# Patient Record
Sex: FEMALE | Race: WHITE | NOT HISPANIC OR LATINO | Employment: PART TIME | URBAN - METROPOLITAN AREA
[De-identification: names, ages, dates, MRNs, and addresses within clinical notes are randomized per-mention and may not be internally consistent; named-entity substitution may affect disease eponyms.]

---

## 2017-08-29 ENCOUNTER — OFFICE VISIT (OUTPATIENT)
Dept: URGENT CARE | Facility: CLINIC | Age: 52
End: 2017-08-29

## 2017-08-29 PROCEDURE — 99203 OFFICE O/P NEW LOW 30 MIN: CPT

## 2017-09-06 DIAGNOSIS — Z12.31 ENCOUNTER FOR SCREENING MAMMOGRAM FOR MALIGNANT NEOPLASM OF BREAST: ICD-10-CM

## 2017-09-06 DIAGNOSIS — Z12.11 ENCOUNTER FOR SCREENING FOR MALIGNANT NEOPLASM OF COLON: ICD-10-CM

## 2017-09-14 ENCOUNTER — ALLSCRIPTS OFFICE VISIT (OUTPATIENT)
Dept: OTHER | Facility: OTHER | Age: 52
End: 2017-09-14

## 2018-01-13 VITALS
BODY MASS INDEX: 24.36 KG/M2 | OXYGEN SATURATION: 97 % | HEART RATE: 72 BPM | SYSTOLIC BLOOD PRESSURE: 162 MMHG | HEIGHT: 62 IN | DIASTOLIC BLOOD PRESSURE: 88 MMHG | TEMPERATURE: 97.4 F | WEIGHT: 132.38 LBS

## 2018-01-23 NOTE — PROGRESS NOTES
Assessment   1  Encounter for preventive health examination (V70 0) (Z00 00)    Plan  Encounter for PPD test    · Administered: PPD  Health Maintenance    · Follow-up visit in 1 year Evaluation and Treatment  Follow-up  Status: Hold For -  Scheduling  Requested for: [id number]    Discussion/Summary  Discussion Summary:   Ppd today for school form  Pt will look into free mammo /pap screening program locally and once has coverage will get colon screen  She is hoping to find a FT job  Rto 1yr/prn1        Medication SE Review and Pt Understands Tx: The treatment plan was reviewed with the patient/guardian  The patient/guardian understands and agrees with the treatment plan       Self Referrals:   Self Referrals: No       1 Amended By: Debbie Aburto; Aug 29 2016 9:05 PM EST    Chief Complaint  Chief Complaint Free Text Note Form: Pt presents for Initial exam  Pt feels well and without complaints at this time  Pt has physical form to be filled out today  Pt doesn't take any medication daily  PPD done today  Pt appetite is good and he does drink water daily  Advance Directives  Advance Directive [facility]:   NO - Patient does not have an advance health care directive  History of Present Illness  HPI: Pt for PE for sub teaching  Has been well, no medical coverage at present  No recent illnesses  Overdue for mammo,gyn colon screenings1        1 Amended By: Debbie Aburto; Aug 29 2016 9:02 PM EST    Review of Systems  Complete-Female:   Constitutional:1  No fever, no chills, feels well, no tiredness, no recent weight gain or weight loss1   Eyes:1  No complaints of eye pain, no red eyes, no eyesight problems, no discharge, no dry eyes, no itching of eyes1   ENT:1  no complaints of earache, no loss of hearing, no nose bleeds, no nasal discharge, no sore throat, no hoarseness1      Cardiovascular:1  No complaints of slow heart rate, no fast heart rate, no chest pain, no palpitations, no leg claudication, no lower extremity edema1   Respiratory:1  No complaints of shortness of breath, no wheezing, no cough, no SOB on exertion, no orthopnea, no PND1   Gastrointestinal:1  No complaints of abdominal pain, no constipation, no nausea or vomiting, no diarrhea, no bloody stools1   Genitourinary:1  No complaints of dysuria, no incontinence, no pelvic pain, no dysmenorrhea, no vaginal discharge or bleeding1   Musculoskeletal:1  arthralgias1  and chronic neck pain1   Integumentary:1  No complaints of skin rash or lesions, no itching, no skin wounds, no breast pain or lump1   Neurological:1  No complaints of headache, no confusion, no convulsions, no numbness, no dizziness or fainting, no tingling, no limb weakness, no difficulty walking1 , no dizziness1  and no limb weakness1   Psychiatric:1  Not suicidal, no sleep disturbance, no anxiety or depression, no change in personality, no emotional problems1  and no sleep disturbances1   Endocrine:1  No complaints of proptosis, no hot flashes, no muscle weakness, no deepening of the voice, no feelings of weakness1   Hematologic/Lymphatic:1  No complaints of swollen glands, no swollen glands in the neck, does not bleed easily, does not bruise easily1         1 Amended By: Arlette Acevedo; Aug 29 2016 9:03 PM EST    Active Problems   1  Encounter for screening mammogram for malignant neoplasm of breast (V76 12)   (Z12 31)    Past Medical History  Active Problems And Past Medical History Reviewed: The active problems and past medical history were reviewed and updated today  Surgical History  Surgical History Reviewed: The surgical history was reviewed and updated today  Family History  Mother   1  FHx: malignant neoplasm of breast (V16 3) (Z80 3)  Father   2  Family history of Stroke, thrombotic  Family History Reviewed: The family history was reviewed and updated today         Social History    · Always uses seat belt   · Always uses sunscreen   · Caffeine use (V49 89) (F15 90)   · Dental care, regularly   · Never a smoker   · Occasional alcohol use   · Single  Social History Reviewed: The social history was reviewed and updated today  The social history was reviewed and is unchanged  Current Meds  1  No Reported Medications Recorded    Allergies   1  Penicillins    Vitals  Signs   Recorded: [id number] 18:08FM   Systolic: 748  Diastolic: 72  Recorded: 50COP2875 01:32PM   Temperature: 97 4 F  Height: 5 ft 2 in  Weight: 131 lb 4 00 oz  BMI Calculated: 24 01  BSA Calculated: 1 6    Physical Exam    Constitutional1    General appearance: No acute distress, well appearing and well nourished  1    Eyes1    Conjunctiva and lids: No swelling, erythema or discharge  1    Pupils and irises: Equal, round and reactive to light  1    Ears, Nose, Mouth, and Throat1    External inspection of ears and nose: Normal 1    Otoscopic examination: Tympanic membranes translucent with normal light reflex  Canals patent without erythema  1    Nasal mucosa, septum, and turbinates: Normal without edema or erythema  1    Oropharynx: Normal with no erythema, edema, exudate or lesions  1    Pulmonary1    Respiratory effort: No increased work of breathing or signs of respiratory distress  1    Auscultation of lungs: Clear to auscultation  1    Cardiovascular1    Palpation of heart: Normal PMI, no thrills  1    Auscultation of heart: Normal rate and rhythm, normal S1 and S2, without murmurs  1    Examination of extremities for edema and/or varicosities: Normal 1    Carotid pulses: Normal 1    Abdomen1    Abdomen: Non-tender, no masses  1    Liver and spleen: No hepatomegaly or splenomegaly  1    Lymphatic1    Palpation of lymph nodes in neck: No lymphadenopathy  1    Musculoskeletal1    Gait and station: Normal 1    Digits and nails: Normal without clubbing or cyanosis  1    Inspection/palpation of joints, bones, and muscles: Normal 1    Skin1    Skin and subcutaneous tissue: Normal without rashes or lesions  1    Neurologic1    Cranial nerves: Cranial nerves 2-12 intact  1    Reflexes: 2+ and symmetric  1    Sensation: No sensory loss  1    Psychiatric1    Orientation to person, place, and time: Normal 1    Mood and affect: Normal 1          1 Amended By: Bucky Le; Aug 29 2016 9:03 PM EST    Signatures   Electronically signed by : Arleth Chamorro DO; Aug 29 2016  1:54PM EST                       (Author)    Electronically signed by : Arleth Chamorro DO; Aug 29 2016  9:05PM EST                       (Author)

## 2018-02-14 ENCOUNTER — TELEPHONE (OUTPATIENT)
Dept: INTERNAL MEDICINE CLINIC | Facility: CLINIC | Age: 53
End: 2018-02-14

## 2018-02-14 DIAGNOSIS — R05.9 COUGH: Primary | ICD-10-CM

## 2018-02-14 RX ORDER — BENZONATATE 100 MG/1
100 CAPSULE ORAL 3 TIMES DAILY PRN
Qty: 30 CAPSULE | Refills: 0 | Status: SHIPPED | OUTPATIENT
Start: 2018-02-14 | End: 2018-02-24

## 2018-02-14 NOTE — TELEPHONE ENCOUNTER
Tessalon perles 100mg tid and can use delsym at hs  Also would add claritin daily or flonase  Increase water intake

## 2019-06-17 DIAGNOSIS — L50.9 HIVES: Primary | ICD-10-CM

## 2019-06-17 RX ORDER — METHYLPREDNISOLONE 4 MG/1
TABLET ORAL
Qty: 21 EACH | Refills: 0 | Status: SHIPPED | OUTPATIENT
Start: 2019-06-17

## 2019-06-27 ENCOUNTER — TELEPHONE (OUTPATIENT)
Dept: INTERNAL MEDICINE CLINIC | Facility: CLINIC | Age: 54
End: 2019-06-27

## 2019-09-16 DIAGNOSIS — Z12.31 ENCOUNTER FOR SCREENING MAMMOGRAM FOR MALIGNANT NEOPLASM OF BREAST: Primary | ICD-10-CM

## 2019-10-23 ENCOUNTER — TELEPHONE (OUTPATIENT)
Dept: INTERNAL MEDICINE CLINIC | Facility: CLINIC | Age: 54
End: 2019-10-23

## 2019-10-23 NOTE — TELEPHONE ENCOUNTER
Please make attempt to reschedule patients No Show appt  from yesterday She hasn't been seen since 2017 and would need appt to stay current with our practice

## 2023-06-06 ENCOUNTER — OFFICE VISIT (OUTPATIENT)
Dept: OBGYN CLINIC | Facility: CLINIC | Age: 58
End: 2023-06-06
Payer: COMMERCIAL

## 2023-06-06 VITALS
WEIGHT: 125 LBS | DIASTOLIC BLOOD PRESSURE: 95 MMHG | TEMPERATURE: 98.1 F | BODY MASS INDEX: 23 KG/M2 | SYSTOLIC BLOOD PRESSURE: 150 MMHG | HEIGHT: 62 IN | HEART RATE: 89 BPM

## 2023-06-06 DIAGNOSIS — G89.29 CHRONIC PAIN OF LEFT KNEE: ICD-10-CM

## 2023-06-06 DIAGNOSIS — M25.562 CHRONIC PAIN OF LEFT KNEE: ICD-10-CM

## 2023-06-06 DIAGNOSIS — S89.92XA INJURY OF LEFT KNEE, INITIAL ENCOUNTER: Primary | ICD-10-CM

## 2023-06-06 PROCEDURE — 99203 OFFICE O/P NEW LOW 30 MIN: CPT | Performed by: STUDENT IN AN ORGANIZED HEALTH CARE EDUCATION/TRAINING PROGRAM

## 2023-06-06 RX ORDER — IBUPROFEN 200 MG
TABLET ORAL EVERY 6 HOURS PRN
COMMUNITY

## 2023-06-06 NOTE — PROGRESS NOTES
1  Injury of left knee, initial encounter        2  Chronic pain of left knee  Ambulatory Referral to Orthopedic Surgery        No orders of the defined types were placed in this encounter  Imaging Studies (I personally reviewed images in PACS and report):    • X-ray left knee 4/14/2023: There is a partially visualized intramedullary rochelle and transverse fixating screw within the proximal tibia  Otherwise there are no acute osseous abnormalities  There is no joint effusion  There is no significant degenerative changes  IMPRESSION:  • Chronic left lateral knee pain just proximal to the fibular head along the lateral joint line  • Secondary to injury approximately 4 months ago after her dog ran into her leg causing a twisting/bending sensation  • Radiographic imaging recently unremarkable of her left knee other than noted surgical hardware  • Differential includes LCL sprain, lateral meniscal injury, lateral meniscal cyst, lateral myofascial strain  • Limited relief with use of OTC hinged knee brace, compression knee bracing, use of topical hyaluronic acid cream      Other factors:  · Of note, 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  · Patient has a history of a tibial injury requiring intramedullary hardware placement-reportedly since 1999    PLAN:    • Clinical exam and radiographic imaging reviewed with patient today, with impression as per above  I have discussed with the patient the pathophysiology of this diagnosis and reviewed how the examination correlates with this diagnosis  • Prior imaging reviewed with patient today as noted above  • Recommended starting with conservative treatments for now with formal physical therapy  Patient deferred this option and preferred to start with home exercise program as she states she does not have the time to attend therapy from her work    Thus, home exercise were provided and "counseled daily adherence  • Furthermore, get desmond she states she has no relief with use of her hinged knee brace/compression knee braces, I recommended she use a compression knee sleeve alternatively so as not to apply pressure along the lateral aspect of her knee which is exacerbating her pain  • In regards to pain control, I recommended use of oral/topical NSAIDs, oral acetaminophen, heat/ice therapy 20 minutes on/off  There was consideration for an intra-articular cortisone injection for therapeutic/diagnostic purposes, but patient deferred this option  Return if symptoms worsen or fail to improve  Portions of the record may have been created with voice recognition software  Occasional wrong word or \"sound a like\" substitutions may have occurred due to the inherent limitations of voice recognition software  Read the chart carefully and recognize, using context, where substitutions have occurred  I have spent a total time of 30 minutes on 06/06/23 in caring for this patient including Diagnostic results, Prognosis, Risks and benefits of tx options, Instructions for management, Patient and family education, Importance of tx compliance, Risk factor reductions, Impressions, Counseling / Coordination of care, Documenting in the medical record, Reviewing / ordering tests, medicine, procedures   and Obtaining or reviewing history    CHIEF COMPLAINT:  Chief Complaint   Patient presents with   • Left Knee - Follow-up         HPI:  Eve Gaxiola is a 61 y o  female  who presents for       Visit 6/6/2023 :  Initial evaluation of left knee pain/injury:  Of note patient has a history of intramedullary tibial rochelle placement in 1999 from a prior injury  She states that she has been doing well with her left knee until a injury approximately 4 months ago  She states her dog ran into her knee causing her to bend/twist in an unspecified mechanism and fall to the ground    She is unsure if she had a popping " sensation  She states ever since this injury she continues to have pain mainly localized over the lateral aspect of her knee and points to the area just proximal to her fibular head  She describes the pain as a sharp/throbbing pain that is worse from prolonged standing and walking  Of note 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  She denies any significant knee swelling  She reports intermittent crepitus of her knee  She denies any sensation of giving out or locking in extension in her knee  She denies any numbness/tingling of her left knee/left lower extremity  She states the pain can radiate down the lateral aspect of her lower leg into her foot  She states repetitive dorsiflexion of her foot also aggravates her lateral knee pain  In regards to pain control, she has been using an OTC topical hyaluronic acid cream, and applying hinged knee braces all which do not provide much relief  She is never had an injection of her knee  She has not seen formal physical therapy for this issue as she states she does not have the time to attend given her work duties  She had seen the ER for this issue and had imaging done of her left knee on 4/14/2023  Medical, Surgical, Family, and Social History    History reviewed  No pertinent past medical history    Past Surgical History:   Procedure Laterality Date   • TIBIA FRACTURE SURGERY       Social History   Social History     Substance and Sexual Activity   Alcohol Use Yes    Comment: Occ     Social History     Substance and Sexual Activity   Drug Use Not on file     Social History     Tobacco Use   Smoking Status Never   Smokeless Tobacco Not on file     Family History   Problem Relation Age of Onset   • Breast cancer Mother    • Stroke Father      Allergies   Allergen Reactions   • Penicillins Rash          Physical Exam  /95 (BP Location: Left arm)   Pulse 89   Temp "98 1 °F (36 7 °C) (Temporal)   Ht 5' 2\" (1 575 m)   Wt 56 7 kg (125 lb)   BMI 22 86 kg/m²     Constitutional:  see vital signs  Gen: well-developed, normocephalic/atraumatic, well-groomed  Pulmonary/Chest: Effort normal  No respiratory distress  Ortho Exam  Left Knee Exam:  Erythema: no  Swelling: no  Increased Warmth: no  Tenderness: +LJL, +just proximal to fibular head  ROM: 0-130  Knee flexion strength: 5/5  Knee extension strength: 5/5  Patellar Grind: negative  Lachman's: negative  Anterior Drawer: negative  Varus laxity: negative  Valgus laxity: negative  [facility]: negative     Sensation intact to light touch     Left hip ROM demonstrates no pain actively or passively    No calf tenderness to palpation     Active foot dorsiflexion aggravates lateral knee pain as well   Active plantarflexion does not aggravates knee pain      Procedures        "